# Patient Record
Sex: FEMALE | Race: WHITE | NOT HISPANIC OR LATINO | Employment: OTHER | URBAN - METROPOLITAN AREA
[De-identification: names, ages, dates, MRNs, and addresses within clinical notes are randomized per-mention and may not be internally consistent; named-entity substitution may affect disease eponyms.]

---

## 2017-08-08 ENCOUNTER — OFFICE VISIT (OUTPATIENT)
Dept: MEDICAL GROUP | Facility: MEDICAL CENTER | Age: 37
End: 2017-08-08
Payer: COMMERCIAL

## 2017-08-08 VITALS
SYSTOLIC BLOOD PRESSURE: 114 MMHG | HEIGHT: 69 IN | TEMPERATURE: 99 F | WEIGHT: 137 LBS | HEART RATE: 62 BPM | BODY MASS INDEX: 20.29 KG/M2 | OXYGEN SATURATION: 98 % | DIASTOLIC BLOOD PRESSURE: 72 MMHG

## 2017-08-08 DIAGNOSIS — H00.011 HORDEOLUM EXTERNUM OF RIGHT UPPER EYELID: ICD-10-CM

## 2017-08-08 PROCEDURE — 99214 OFFICE O/P EST MOD 30 MIN: CPT | Performed by: FAMILY MEDICINE

## 2017-08-08 RX ORDER — SULFACETAMIDE SODIUM 100 MG/ML
1 SOLUTION/ DROPS OPHTHALMIC
Qty: 1 BOTTLE | Refills: 0 | Status: SHIPPED | OUTPATIENT
Start: 2017-08-08 | End: 2020-06-17

## 2017-08-08 ASSESSMENT — PATIENT HEALTH QUESTIONNAIRE - PHQ9: CLINICAL INTERPRETATION OF PHQ2 SCORE: 0

## 2017-08-08 NOTE — MR AVS SNAPSHOT
"        Perlita Sahu   2017 11:20 AM   Office Visit   MRN: [id number]    Department:  [facility]   Dept Phone:  [phone]    Description:  Female : 1980   Provider:  Lauren Ivory M.D.           Reason for Visit     Eye Problem right eye pain      Allergies as of 2017     No Known Allergies      You were diagnosed with     Hordeolum externum of right upper eyelid   [295385]         Vital Signs     Blood Pressure Pulse Temperature Height Weight Body Mass Index    114/72 mmHg 62 37.2 °C (99 °F) 1.753 m (5' 9.02\") 62.143 kg (137 lb) 20.22 kg/m2    Oxygen Saturation Smoking Status                98% Never Smoker           Basic Information     Date Of Birth Sex Race Ethnicity Preferred Language    1980 Female White Non- English      Problem List              ICD-10-CM Priority Class Noted - Resolved    RLQ abdominal pain R10.31   2014 - Present    IUD (intrauterine device) in place Z97.5   2014 - Present    Hordeolum externum of right upper eyelid H00.011   2017 - Present      Health Maintenance        Date Due Completion Dates    IMM DTaP/Tdap/Td Vaccine (1 - Tdap) 1998 ---    PAP SMEAR 2000 ---    IMM INFLUENZA (1) 2017 ---            Current Immunizations     No immunizations on file.      Below and/or attached are the medications your provider expects you to take. Review all of your home medications and newly ordered medications with your provider and/or pharmacist. Follow medication instructions as directed by your provider and/or pharmacist. Please keep your medication list with you and share with your provider. Update the information when medications are discontinued, doses are changed, or new medications (including over-the-counter products) are added; and carry medication information at all times in the event of emergency situations     Allergies:  No Known Allergies          Medications  Valid as of: 2017 - 11:40 AM    Generic " Name Brand Name Tablet Size Instructions for use    Glucosamine Sulfate (Cap) glucosamine Sulfate 500 MG Take 1,500 mg by mouth 3 times a day, with meals.        Multiple Vitamins-Calcium (Tab) ONE-A-DAY WOMENS FORMULA  Take 1 Tab by mouth every day.        Naproxen (Tab) NAPROSYN 500 MG Take 1 Tab by mouth 2 times a day, with meals.        Sulfacetamide Sodium (Solution) SULAMYD 10 % Place 1 Drop in right eye every 3 hours.        .                 Medicines prescribed today were sent to:     [facility] [address] [place] [address], [place] - [address] [facility]    [address] [facility] [place] [place] [address]    Phone: [phone] Fax: [phone]    Open 24 Hours?: No      Medication refill instructions:       If your prescription bottle indicates you have medication refills left, it is not necessary to call your provider’s office. Please contact your pharmacy and they will refill your medication.    If your prescription bottle indicates you do not have any refills left, you may request refills at any time through one of the following ways: The online [facility] system (except Urgent Care), by calling your provider’s office, or by asking your pharmacy to contact your provider’s office with a refill request. Medication refills are processed only during regular business hours and may not be available until the next business day. Your provider may request additional information or to have a follow-up visit with you prior to refilling your medication.   *Please Note: Medication refills are assigned a new Rx number when refilled electronically. Your pharmacy may indicate that no refills were authorized even though a new prescription for the same medication is available at the pharmacy. Please request the medicine by name with the pharmacy before contacting your provider for a refill.        Instructions    Stye  A stye is a bump on your eyelid caused by a bacterial infection. A stye can form inside  the eyelid (internal stye) or outside the eyelid (external stye). An internal stye may be caused by an infected oil-producing gland inside your eyelid. An external stye may be caused by an infection at the base of your eyelash (hair follicle).  Styes are very common. Anyone can get them at any age. They usually occur in just one eye, but you may have more than one in either eye.   CAUSES   The infection is almost always caused by bacteria called Staphylococcus aureus. This is a common type of bacteria that lives on your skin.  RISK FACTORS  You may be at higher risk for a stye if you have had one before. You may also be at higher risk if you have:  · Diabetes.  · Long-term illness.  · Long-term eye redness.  · A skin condition called seborrhea.  · High fat levels in your blood (lipids).  SIGNS AND SYMPTOMS   Eyelid pain is the most common symptom of a stye. Internal styes are more painful than external styes. Other signs and symptoms may include:  · Painful swelling of your eyelid.  · A scratchy feeling in your eye.  · Tearing and redness of your eye.  · Pus draining from the stye.  DIAGNOSIS   Your health care provider may be able to diagnose a stye just by examining your eye. The health care provider may also check to make sure:  · You do not have a fever or other signs of a more serious infection.  · The infection has not spread to other parts of your eye or areas around your eye.  TREATMENT   Most styes will clear up in a few days without treatment. In some cases, you may need to use antibiotic drops or ointment to prevent infection. Your health care provider may have to drain the stye surgically if your stye is:  · Large.  · Causing a lot of pain.  · Interfering with your vision.  This can be done using a thin blade or a needle.   HOME CARE INSTRUCTIONS   · Take medicines only as directed by your health care provider.  · Apply a clean, warm compress to your eye for 10 minutes, 4 times a day.  · Do not wear  contact lenses or eye makeup until your stye has healed.  · Do not try to pop or drain the stye.  SEEK MEDICAL CARE IF:  · You have chills or a fever.  · Your stye does not go away after several days.  · Your stye affects your vision.  · Your eyeball becomes swollen, red, or painful.  MAKE SURE YOU:  · Understand these instructions.  · Will watch your condition.  · Will get help right away if you are not doing well or get worse.     This information is not intended to replace advice given to you by your health care provider. Make sure you discuss any questions you have with your health care provider.     Document Released: 09/27/2006 Document Revised: 01/08/2016 Document Reviewed: 04/03/2015  [employer] Interactive Patient Education ©2016 [employer]            Pap[employer] Access Code: Activation code not generated  Current [facility] Status: Active

## 2017-08-08 NOTE — ASSESSMENT & PLAN NOTE
Patient complains of swelling, redness and pain in her right eye. This started this weekend. The swelling is worse first thing in the morning. She is not having any discharge. She denies any trauma to the eye or anything getting in her eye. It is not affecting her vision.

## 2017-08-08 NOTE — PROGRESS NOTES
"Subjective:     Chief Complaint   Patient presents with   • Eye Problem     right eye pain       Perlita Sahu is a 37 y.o. female here today for evaluation and management of:    Hordeolum externum of right upper eyelid  Patient complains of swelling, redness and pain in her right eye. This started this weekend. The swelling is worse first thing in the morning. She is not having any discharge. She denies any trauma to the eye or anything getting in her eye. It is not affecting her vision.         No Known Allergies    Current medicines (including changes today)  Current Outpatient Prescriptions   Medication Sig Dispense Refill   • sulfacetamide (SULAMYD) 10 % Solution Place 1 Drop in right eye every 3 hours. 1 Bottle 0   • naproxen (NAPROSYN) 500 MG TABS Take 1 Tab by mouth 2 times a day, with meals. 60 Tab 1   • glucosamine Sulfate 500 MG CAPS Take 1,500 mg by mouth 3 times a day, with meals.     • Multiple Vitamins-Calcium (ONE-A-DAY WOMENS FORMULA) TABS Take 1 Tab by mouth every day.       No current facility-administered medications for this visit.       She  has no past medical history on file.    Patient Active Problem List    Diagnosis Date Noted   • Hordeolum externum of right upper eyelid 08/08/2017   • RLQ abdominal pain 09/11/2014   • IUD (intrauterine device) in place 09/11/2014       ROS   No fever or chills.  No nausea or vomiting.  No chest pain or palpitations.  No cough or SOB.  No pain with urination or hematuria.  No black or bloody stools.       Objective:     Blood pressure 114/72, pulse 62, temperature 37.2 °C (99 °F), height 1.753 m (5' 9.02\"), weight 62.143 kg (137 lb), SpO2 98 %. Body mass index is 20.22 kg/(m^2).   Physical Exam:  Well developed, well nourished.  Alert, oriented in no acute distress.  Eye contact is good, speech goal directed, affect calm  Eyes: conjunctiva slightly injected on the right, sclera non-icteric. There is a stye present on the right upper lid internally at " the nasal edge. Pustule present. No discharge. PERRL. EOMs intact        Assessment and Plan:   The following treatment plan was discussed    1. Hordeolum externum of right upper eyelid  Warm pack 4 times a day. Sulamyd as directed. Call if not improving and may need incision and drainage.  - sulfacetamide (SULAMYD) 10 % Solution; Place 1 Drop in right eye every 3 hours.  Dispense: 1 Bottle; Refill: 0    Any change or worsening of signs or symptoms, patient encouraged to follow-up or report to the emergency room for further evaluation. Patient understands and agrees.    Followup: Return if symptoms worsen or fail to improve.

## 2019-12-20 ENCOUNTER — APPOINTMENT (OUTPATIENT)
Dept: RADIOLOGY | Facility: MEDICAL CENTER | Age: 39
End: 2019-12-20
Attending: EMERGENCY MEDICINE
Payer: COMMERCIAL

## 2019-12-20 ENCOUNTER — HOSPITAL ENCOUNTER (EMERGENCY)
Facility: MEDICAL CENTER | Age: 39
End: 2019-12-20
Attending: EMERGENCY MEDICINE
Payer: COMMERCIAL

## 2019-12-20 VITALS
WEIGHT: 138.23 LBS | SYSTOLIC BLOOD PRESSURE: 128 MMHG | BODY MASS INDEX: 20.47 KG/M2 | HEIGHT: 69 IN | HEART RATE: 50 BPM | TEMPERATURE: 98.8 F | OXYGEN SATURATION: 100 % | RESPIRATION RATE: 18 BRPM | DIASTOLIC BLOOD PRESSURE: 85 MMHG

## 2019-12-20 DIAGNOSIS — R51.9 NONINTRACTABLE EPISODIC HEADACHE, UNSPECIFIED HEADACHE TYPE: ICD-10-CM

## 2019-12-20 DIAGNOSIS — M54.2 NECK PAIN: ICD-10-CM

## 2019-12-20 LAB
ALBUMIN SERPL BCP-MCNC: 4.5 G/DL (ref 3.2–4.9)
ALBUMIN/GLOB SERPL: 1.6 G/DL
ALP SERPL-CCNC: 37 U/L (ref 30–99)
ALT SERPL-CCNC: 12 U/L (ref 2–50)
ANION GAP SERPL CALC-SCNC: 14 MMOL/L (ref 0–11.9)
AST SERPL-CCNC: 15 U/L (ref 12–45)
BASOPHILS # BLD AUTO: 0.7 % (ref 0–1.8)
BASOPHILS # BLD: 0.05 K/UL (ref 0–0.12)
BILIRUB SERPL-MCNC: 0.3 MG/DL (ref 0.1–1.5)
BUN SERPL-MCNC: 14 MG/DL (ref 8–22)
CALCIUM SERPL-MCNC: 9.3 MG/DL (ref 8.4–10.2)
CHLORIDE SERPL-SCNC: 102 MMOL/L (ref 96–112)
CO2 SERPL-SCNC: 23 MMOL/L (ref 20–33)
CREAT SERPL-MCNC: 0.73 MG/DL (ref 0.5–1.4)
EOSINOPHIL # BLD AUTO: 0.09 K/UL (ref 0–0.51)
EOSINOPHIL NFR BLD: 1.2 % (ref 0–6.9)
ERYTHROCYTE [DISTWIDTH] IN BLOOD BY AUTOMATED COUNT: 40.7 FL (ref 35.9–50)
GLOBULIN SER CALC-MCNC: 2.9 G/DL (ref 1.9–3.5)
GLUCOSE SERPL-MCNC: 91 MG/DL (ref 65–99)
HCG SERPL QL: NEGATIVE
HCT VFR BLD AUTO: 38.4 % (ref 37–47)
HGB BLD-MCNC: 12.9 G/DL (ref 12–16)
IMM GRANULOCYTES # BLD AUTO: 0.02 K/UL (ref 0–0.11)
IMM GRANULOCYTES NFR BLD AUTO: 0.3 % (ref 0–0.9)
LYMPHOCYTES # BLD AUTO: 2.38 K/UL (ref 1–4.8)
LYMPHOCYTES NFR BLD: 31.4 % (ref 22–41)
MCH RBC QN AUTO: 31.1 PG (ref 27–33)
MCHC RBC AUTO-ENTMCNC: 33.6 G/DL (ref 33.6–35)
MCV RBC AUTO: 92.5 FL (ref 81.4–97.8)
MONOCYTES # BLD AUTO: 0.6 K/UL (ref 0–0.85)
MONOCYTES NFR BLD AUTO: 7.9 % (ref 0–13.4)
NEUTROPHILS # BLD AUTO: 4.43 K/UL (ref 2–7.15)
NEUTROPHILS NFR BLD: 58.5 % (ref 44–72)
NRBC # BLD AUTO: 0 K/UL
NRBC BLD-RTO: 0 /100 WBC
PLATELET # BLD AUTO: 189 K/UL (ref 164–446)
PMV BLD AUTO: 9.9 FL (ref 9–12.9)
POTASSIUM SERPL-SCNC: 4.1 MMOL/L (ref 3.6–5.5)
PROT SERPL-MCNC: 7.4 G/DL (ref 6–8.2)
RBC # BLD AUTO: 4.15 M/UL (ref 4.2–5.4)
SODIUM SERPL-SCNC: 139 MMOL/L (ref 135–145)
WBC # BLD AUTO: 7.6 K/UL (ref 4.8–10.8)

## 2019-12-20 PROCEDURE — 70498 CT ANGIOGRAPHY NECK: CPT

## 2019-12-20 PROCEDURE — 70496 CT ANGIOGRAPHY HEAD: CPT

## 2019-12-20 PROCEDURE — 99284 EMERGENCY DEPT VISIT MOD MDM: CPT

## 2019-12-20 PROCEDURE — 84703 CHORIONIC GONADOTROPIN ASSAY: CPT

## 2019-12-20 PROCEDURE — 80053 COMPREHEN METABOLIC PANEL: CPT

## 2019-12-20 PROCEDURE — 85025 COMPLETE CBC W/AUTO DIFF WBC: CPT

## 2019-12-20 PROCEDURE — A9270 NON-COVERED ITEM OR SERVICE: HCPCS | Performed by: EMERGENCY MEDICINE

## 2019-12-20 PROCEDURE — 700102 HCHG RX REV CODE 250 W/ 637 OVERRIDE(OP): Performed by: EMERGENCY MEDICINE

## 2019-12-20 PROCEDURE — 36415 COLL VENOUS BLD VENIPUNCTURE: CPT

## 2019-12-20 PROCEDURE — 700117 HCHG RX CONTRAST REV CODE 255: Performed by: EMERGENCY MEDICINE

## 2019-12-20 RX ORDER — CARBAMAZEPINE 100 MG/1
100 TABLET, EXTENDED RELEASE ORAL ONCE
Status: COMPLETED | OUTPATIENT
Start: 2019-12-20 | End: 2019-12-20

## 2019-12-20 RX ORDER — CARBAMAZEPINE 100 MG/1
100 TABLET, EXTENDED RELEASE ORAL 2 TIMES DAILY
Qty: 60 TAB | Refills: 0 | Status: SHIPPED | OUTPATIENT
Start: 2019-12-20 | End: 2019-12-30

## 2019-12-20 RX ADMIN — IOHEXOL 100 ML: 350 INJECTION, SOLUTION INTRAVENOUS at 18:49

## 2019-12-20 RX ADMIN — CARBAMAZEPINE 100 MG: 100 TABLET, EXTENDED RELEASE ORAL at 19:31

## 2019-12-21 NOTE — DISCHARGE INSTRUCTIONS
Please follow-up with your primary care physician at the opening of business hours.  Call Monday morning to schedule a close follow-up appointment.  Additionally, please call the neurology clinic listed Monday morning to schedule a follow-up appointment.  If you develop any new or worsening symptoms before you are able to see your primary care physician, or if the medication prescribed does not help your symptoms over the next 24 to 48 hours, please return immediately to the emergency department for complete recheck.  Additionally please return if you develop any new symptoms such as worsening headache, nausea, vomiting, vision changes, difficulty walking, or any further concerns.

## 2019-12-21 NOTE — ED TRIAGE NOTES
"Perlita Eid 39 y.o. female   Chief Complaint   Patient presents with   • Headache     Headache that started Dec 2; pt states she has been woken up the last 2 nights in severe pain on the right side of her head with associate nausea; tyelnol taken without relief   • Nausea   • Neck Pain     Pt c/o neck pain with turning her neck; atraumatic     /96   Pulse 61   Temp 37.4 °C (99.3 °F) (Temporal)   Resp 18   Ht 1.753 m (5' 9\")   Wt 62.7 kg (138 lb 3.7 oz)   SpO2 99%   BMI 20.41 kg/m²     Pt returned to lobby and educated on triage process. Advised to notify RN with changes or concerns.   Pt states pain is worse with movement.  "

## 2019-12-21 NOTE — ED PROVIDER NOTES
ED Provider Note    Chief Complaint:   Headache, neck pain    HPI:  Perlita Eid is a 39 y.o. female who presents with chief complaint of headache and neck pain.  Her symptoms began 18 days ago. She describes a pain that originated in the right neck and right occipital region that was initially mild. Since time of onset, her pain has progressively worsened. She describes a burning sensation that radiates through the left neck and left scalp, exacerbated by turning the neck. It is alleviated (though does not resolve) when she holds her neck straight. She did have a mild head injury about six weeks ago, however had no residual headaches or nausea at that time.  This seems unrelated to her symptoms today.  Over the past 2 to 3 days she has developed some associated nausea.  Additionally she is woke from sleep in the last couple of nights with severe pain to the right head and right neck.  She said no associated fevers, no thoracic back pain, no lumbar back pain.  Denies any upper or lower extremity weakness.  She denies any facial droop, no abnormal speech, states that at times her vision in her left eye is seem blurred however that rapidly returns to normal.  She has no ongoing vision changes.  She denies any prior similar symptoms.    She has no prior history of venous thromboembolism, no exogenous estrogen use    Review of Systems:  See HPI for pertinent positives and negatives. All other systems negative.    Past Medical History:       Social History:  Social History     Tobacco Use   • Smoking status: Never Smoker   • Smokeless tobacco: Never Used   Substance and Sexual Activity   • Alcohol use: No   • Drug use: No   • Sexual activity: Yes     Partners: Male       Surgical History:   has a past surgical history that includes other orthopedic surgery; breast reconstruction (2001); and dental extraction(s).    Current Medications:  Home Medications    **Home medications have not yet been reviewed for  "this encounter**         Allergies:  No Known Allergies    Physical Exam:  Vital Signs: /96   Pulse 61   Temp 37.4 °C (99.3 °F) (Temporal)   Resp 18   Ht 1.753 m (5' 9\")   Wt 62.7 kg (138 lb 3.7 oz)   SpO2 99%   BMI 20.41 kg/m²   Constitutional: Alert, no acute distress  HENT: Moist mucus membranes, normal posterior pharynx, no intraoral lesions  Eyes: Pupils equal and reactive, normal conjunctiva  Neck: Supple, normal range of motion, no stridor  Cardiovascular: Extremities are warm and well perfused, no murmur appreciated, normal cardiac auscultation  Pulmonary: No respiratory distress, normal work of breathing, no accessory muscule usage, breath sounds clear and equal bilaterally  Abdomen: Soft, non-distended, non-tender to palpation, no peritoneal signs  Skin: Warm, dry, no rashes or lesions, no vesicular lesions over the affected area  Musculoskeletal: CN II-XII intact, speech normal, muscle strength 5/5 in all four extremities, no facial droop, no gaze deficit sensation grossly intact, no truncal ataxia  Neurologic: Alert, oriented, normal speech, normal motor function  Psychiatric: Normal and appropriate mood and affect    Medical records reviewed for continuity of care.  No recent visits for similar symptoms.  Patient did have an ED visit 5 years ago for closed head injury, no further visits for similar symptoms since that time.    Labs:  Labs Reviewed   CBC WITH DIFFERENTIAL - Abnormal; Notable for the following components:       Result Value    RBC 4.15 (*)     All other components within normal limits   COMP METABOLIC PANEL - Abnormal; Notable for the following components:    Anion Gap 14.0 (*)     All other components within normal limits   HCG QUAL SERUM   ESTIMATED GFR       Radiology:  CT-CTA NECK WITH & W/O-POST PROCESSING   Final Result      CT angiogram of the neck within normal limits.      CT-CTA HEAD WITH & W/O-POST PROCESS   Final Result      1.  CT angiogram of the [place] of " [place] within normal limits.      2.  CT head without and with contrast within normal limits           ED Medications Administered:  Medications   iohexol (OMNIPAQUE) 350 mg/mL (100 mL Intravenous Given 12/20/19 1849)       Differential diagnosis:  Trigeminal neuralgia, cervical artery dissection or occlusion, intracranial mass    MDM:  Patient presents with chief complaint of headache and neck pain, now worsening in severity and with associated nausea. This does not seem to be associated with any head trauma.  She has no evidence of shingles, though pain does seem to be in the trigeminal nerve distribution.  She had no sudden onset headache, history is less concerning with subarachnoid hemorrhage.    On laboratory evaluation CBC is reassuring, WBC is within normal limits, patient is afebrile without meningeal signs, doubt infectious etiology.  hCG is negative.  CMP is entirely within normal limits.    CTA of the neck is within normal limits.  CT of the head is within normal limits.    At this time, etiology of the patient's headache is unclear.  This may represent a trigeminal neuralgia, will treat with carbamazepine.  Plan is time of discharge home with primary care follow-up.  Additionally I have referred the patient to neurology, she is counseled to call at the opening of business hours to schedule a follow-up appointment. Return precautions were discussed with the patient, and provided in written form with the patient's discharge instructions.     Blood pressure today is greater than 120/80, patient is instructed to follow up with primary care provider for blood pressure recheck.    Disposition:  Discharge home in stable condition    Final Impression:  1. Nonintractable episodic headache, unspecified headache type    2. Neck pain        Electronically signed by: Yvette Brito MD, 12/20/2019 7:20 PM

## 2020-06-09 ENCOUNTER — TELEPHONE (OUTPATIENT)
Dept: SCHEDULING | Facility: IMAGING CENTER | Age: 40
End: 2020-06-09

## 2020-06-17 ENCOUNTER — OFFICE VISIT (OUTPATIENT)
Dept: MEDICAL GROUP | Facility: LAB | Age: 40
End: 2020-06-17
Payer: COMMERCIAL

## 2020-06-17 VITALS
RESPIRATION RATE: 12 BRPM | SYSTOLIC BLOOD PRESSURE: 106 MMHG | HEART RATE: 70 BPM | HEIGHT: 69 IN | TEMPERATURE: 98.7 F | BODY MASS INDEX: 21.92 KG/M2 | WEIGHT: 148 LBS | OXYGEN SATURATION: 98 % | DIASTOLIC BLOOD PRESSURE: 60 MMHG

## 2020-06-17 DIAGNOSIS — Z76.89 ENCOUNTER TO ESTABLISH CARE: ICD-10-CM

## 2020-06-17 PROBLEM — H00.011 HORDEOLUM EXTERNUM OF RIGHT UPPER EYELID: Status: RESOLVED | Noted: 2017-08-08 | Resolved: 2020-06-17

## 2020-06-17 PROCEDURE — 99396 PREV VISIT EST AGE 40-64: CPT | Performed by: FAMILY MEDICINE

## 2020-06-17 ASSESSMENT — ENCOUNTER SYMPTOMS
CHILLS: 0
CONSTIPATION: 0
ABDOMINAL PAIN: 0
BLURRED VISION: 0
VOMITING: 0
FEVER: 0
DIARRHEA: 0
NAUSEA: 0
PALPITATIONS: 0
BLOOD IN STOOL: 0
SHORTNESS OF BREATH: 0
WHEEZING: 0

## 2020-06-17 ASSESSMENT — PATIENT HEALTH QUESTIONNAIRE - PHQ9: CLINICAL INTERPRETATION OF PHQ2 SCORE: 0

## 2020-06-17 ASSESSMENT — FIBROSIS 4 INDEX: FIB4 SCORE: 0.92

## 2020-06-17 NOTE — LETTER
[facility]  Luis F Claros M.D.  [address] [facility] [address]  [place] [address]  Fax: [phone]   Authorization for Release/Disclosure of   Protected Health Information   Name: EVE ZIMMERMAN : 1980 SSN: [id number]   Address: [address] [place] [address]   [place] [address] Phone:    [phone] (home)    I authorize the entity listed below to release/disclose the PHI below to:   [facility]/Luis F Claros M.D. and Luis F Claros M.D.   Provider or Entity Name: Lionel Harris M.D.     Address   [facility]   Phone:[phone]    Fax: [phone]   Reason for request: continuity of care   Information to be released:    [  ] LAST COLONOSCOPY,  including any PATH REPORT and follow-up  [  ] LAST FIT/COLOGUARD RESULT [  ] LAST DEXA  [ XXX ] LAST MAMMOGRAM  [ XXX ] LAST PAP  [  ] LAST LABS [  ] RETINA EXAM REPORT  [  ] IMMUNIZATION RECORDS  [  ] Release all info      [  ] Check here and initial the line next to each item to release ALL health information INCLUDING  _____ Care and treatment for drug and / or alcohol abuse  _____ HIV testing, infection status, or AIDS  _____ Genetic Testing    DATES OF SERVICE OR TIME PERIOD TO BE DISCLOSED: _____________  I understand and acknowledge that:  * This Authorization may be revoked at any time by you in writing, except if your health information has already been used or disclosed.  * Your health information that will be used or disclosed as a result of you signing this authorization could be re-disclosed by the recipient. If this occurs, your re-disclosed health information may no longer be protected by State or Federal laws.  * You may refuse to sign this Authorization. Your refusal will not affect your ability to obtain treatment.  * This Authorization becomes effective upon signing and will  on (date) __________.      If no date is indicated, this Authorization will  one (1) year from the signature date.     Name: Perlita Eid    Signature:   Date:     6/17/2020       PLEASE FAX REQUESTED RECORDS BACK TO: [phone]

## 2020-06-17 NOTE — PROGRESS NOTES
"Subjective:   Perlita Eid is a 39 y.o. female here today for   Chief Complaint   Patient presents with   • Establish Care   • Annual Exam     CDL        #Establish care:  -No questions or concerns at this time.  -Reviewed all past medical history, social history, family history.  -Reviewed all screening/vaccinations.  Screening needed at this time: Mammogram.  -Working on appropriate diet and exercise regimen.  Rides horses, barrel racing on the weekends.  -Currently , one child.  Still sexually active with .  No concerns regarding relationship.  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    No Known Allergies      Current medicines (including changes today)  Current Outpatient Medications   Medication Sig Dispense Refill   • B Complex Vitamins (VITAMIN B COMPLEX PO) Take  by mouth.       No current facility-administered medications for this visit.      She  has no past medical history on file.    ROS   Review of Systems   Constitutional: Negative for chills and fever.   HENT: Negative for hearing loss.    Eyes: Negative for blurred vision.   Respiratory: Negative for shortness of breath and wheezing.    Cardiovascular: Negative for chest pain and palpitations.   Gastrointestinal: Negative for abdominal pain, blood in stool, constipation, diarrhea, nausea and vomiting.   Skin: Negative for rash.        Objective:     Physical Exam:  /60 (BP Location: Right arm, Patient Position: Sitting, BP Cuff Size: Adult)   Pulse 70   Temp 37.1 °C (98.7 °F) (Temporal)   Resp 12   Ht 1.753 m (5' 9\")   Wt 67.1 kg (148 lb)   SpO2 98%  Body mass index is 21.86 kg/m².   Constitutional: Alert, no distress.  Skin: Warm, dry, good turgor, no rashes in visible areas.  Eye: Equal, round and reactive, conjunctiva clear, lids normal.  ENMT: TM's clear bilaterally, lips without lesions, good dentition, oropharynx clear.  Neck: " Trachea midline, no masses, no thyromegaly. No cervical or supraclavicular lymphadenopathy.  Respiratory: Unlabored respiratory effort, lungs clear to auscultation, no wheezes, no rhonchi.  Cardiovascular: Normal S1, S2, no murmur, no edema.  Abdomen: Soft, non-tender, no masses, no hepatosplenomegaly.  Psych: Alert and oriented x3, normal affect and mood.    Assessment and Plan:     1. Encounter to establish care  -Questions concerns were answered at this time.  -Screenings/vaccinations need at this time: Mammogram.  -Encourage patient continue appropriate diet and exercise regimen.  -Unfortunately unable to complete the CDL physical examination as we are not licensed to do so in this office.  Patient will follow-up with occupational health to have this completed.  -Follow-up for yearly annual physical exam, sooner if needed.      Followup: Return in about 1 year (around 6/17/2021).         PLEASE NOTE: This dictation was created using voice recognition software. I have made every reasonable attempt to correct obvious errors, but I expect that there are errors of grammar and possibly content that I did not discover before finalizing the note.

## 2021-06-17 ENCOUNTER — APPOINTMENT (OUTPATIENT)
Dept: MEDICAL GROUP | Facility: LAB | Age: 41
End: 2021-06-17
Payer: COMMERCIAL